# Patient Record
Sex: FEMALE
[De-identification: names, ages, dates, MRNs, and addresses within clinical notes are randomized per-mention and may not be internally consistent; named-entity substitution may affect disease eponyms.]

---

## 2022-09-12 ENCOUNTER — APPOINTMENT (OUTPATIENT)
Dept: PULMONOLOGY | Facility: CLINIC | Age: 47
End: 2022-09-12

## 2022-09-12 VITALS
WEIGHT: 123 LBS | HEART RATE: 70 BPM | BODY MASS INDEX: 21.79 KG/M2 | SYSTOLIC BLOOD PRESSURE: 113 MMHG | DIASTOLIC BLOOD PRESSURE: 70 MMHG | HEIGHT: 63 IN

## 2022-09-12 DIAGNOSIS — R06.83 SNORING: ICD-10-CM

## 2022-09-12 DIAGNOSIS — Z78.9 OTHER SPECIFIED HEALTH STATUS: ICD-10-CM

## 2022-09-12 DIAGNOSIS — G47.9 SLEEP DISORDER, UNSPECIFIED: ICD-10-CM

## 2022-09-12 DIAGNOSIS — F51.04 PSYCHOPHYSIOLOGIC INSOMNIA: ICD-10-CM

## 2022-09-12 PROBLEM — Z00.00 ENCOUNTER FOR PREVENTIVE HEALTH EXAMINATION: Status: ACTIVE | Noted: 2022-09-12

## 2022-09-12 PROCEDURE — 99203 OFFICE O/P NEW LOW 30 MIN: CPT

## 2022-09-12 NOTE — ASSESSMENT
[FreeTextEntry1] : 47-year-old woman with significant sleep disturbance causing exhaustion during the day.  Patient is waking up several times in the middle of the night and not able to fall back to sleep.  She has tried several medications available over-the-counter with no improvement. She is sleepy while driving.\par \par Patient is coming to get evaluated.\par \par Will do a PSG.  Sometimes there are periodic limb movements disturbing the sleep qualilty.\par \par Patient does not fit the typical sleep apnea patient profile. Will do an in lab PSG to look at more details.

## 2022-09-12 NOTE — HISTORY OF PRESENT ILLNESS
[FreeTextEntry1] : Dr. Parker\par 47 year old woman  with history of covid (april 2020 mild disease)  is here in the sleep center to address sleep disturbance.  Patient is sleepy with Raymond sleepiness score of 14.  Patient was told that she snores slightly, does not have any witnessed apneas.  Patient's bedtime is around 9 PM wakes up in the morning around 5 AM. She is waking up few times a night, can not fall back to sleep easily.  She feels exhausted when she wakes up.  Patient deos not drink coffee. Patient does not have any headaches or nocturia.  She is sleepy while driving.\par \par She tried melatonin, cbd, thc, cbn, several other over the counter medications with no change in sleep disturbance and exhaustion.